# Patient Record
(demographics unavailable — no encounter records)

---

## 2025-03-10 NOTE — REASON FOR VISIT
"  \"I just don't know if subliminally I am trying to get fatter. I just haven't done anything.   \"I don't know where I am but I'm just not helping myself at all\".       " [Follow-Up Visit] : a follow-up visit for [Thrombocytosis] : thrombocytosis

## 2025-03-17 NOTE — HISTORY OF PRESENT ILLNESS
[de-identified] : Ms. Abraham is a 39 year old woman who presents for initial consultation of thrombocytosis.\par  Patient states that these problems started in late 2016: she had her son. She had preeclampsia during the birthing process and developed post birth hypertension and , liver damage which appeared as a nonalcoholic fatty liver syndrome.  \par  She was initially diagnosed with MPN in 2019 and treated by Dr. Viera ([facility]), She was placed on a baby aspirin 81mg.  \par  Moved from [place] to [place] and wanted to changed to a different hematologist that was closer.  \par  Most recent labs on 2022 revealed that her platelets of 877\par  Patient denies any stroke like symptoms, DVTs, or pulmonary emboli. \par  She currently unemployed but she was a  which required a lot of sitting. \par  \par  Age of Menarche: 14 year old\par  LMP: current: 2022 \par  OCP/HRT:none\par   [de-identified] : The patient presents for follow up. Pt here for follow up Overall feels well Has not been having any issues with h/a, vision changes, dizziness, sob, chest pain, leg pain  Had MRI of brain on 09/2024, results reviewed with pt

## 2025-03-17 NOTE — DISCUSSION/SUMMARY
[FreeTextEntry1] : Voicemail left for patient 6/28 @ 850AM reviewing blood work;\par  both ferritin and iron sati increased patient likely absorbing PO iron\par  ferritin 35 from 18 and iron sat 34% from 14%; patient told she can try decr PO iron to TIW as she is constipated.\par  Vitamin D level now 37.8 since decr PO vitamin D to daily dosing.\par  Patient told to contact office if she has any questions and we will repeat all blood work in the fall.

## 2025-03-17 NOTE — HISTORY OF PRESENT ILLNESS
[de-identified] : Ms. Abraham is a 39 year old woman who presents for initial consultation of thrombocytosis.\par  Patient states that these problems started in late 2016: she had her son. She had preeclampsia during the birthing process and developed post birth hypertension and , liver damage which appeared as a nonalcoholic fatty liver syndrome.  \par  She was initially diagnosed with MPN in 2019 and treated by Dr. Viera ([facility]), She was placed on a baby aspirin 81mg.  \par  Moved from [place] to [place] and wanted to changed to a different hematologist that was closer.  \par  Most recent labs on 2022 revealed that her platelets of 877\par  Patient denies any stroke like symptoms, DVTs, or pulmonary emboli. \par  She currently unemployed but she was a  which required a lot of sitting. \par  \par  Age of Menarche: 14 year old\par  LMP: current: 2022 \par  OCP/HRT:none\par   [de-identified] : The patient presents for follow up. Pt here for follow up Overall feels well Has not been having any issues with h/a, vision changes, dizziness, sob, chest pain, leg pain  Had MRI of brain on 09/2024, results reviewed with pt

## 2025-03-17 NOTE — BEGINNING OF VISIT
[0] : 2) Feeling down, depressed, or hopeless: Not at all (0) [PHQ-2 Negative] : PHQ-2 Negative [Pain Scale: ___] : On a scale of 1-10, today the patient's pain is a(n) [unfilled]. [Never] : Never [Date Discussed (MM/DD/YY): ___] : Discussed: [unfilled] [Patient/Caregiver not ready to engage] : Patient/Caregiver not ready to engage [[id number]] : 0

## 2025-03-17 NOTE — BEGINNING OF VISIT
[0] : 2) Feeling down, depressed, or hopeless: Not at all (0) [PHQ-2 Negative] : PHQ-2 Negative [Pain Scale: ___] : On a scale of 1-10, today the patient's pain is a(n) [unfilled]. [Never] : Never [Date Discussed (MM/DD/YY): ___] : Discussed: [unfilled] [Patient/Caregiver not ready to engage] : Patient/Caregiver not ready to engage [[id number]] : 0

## 2025-03-17 NOTE — HISTORY OF PRESENT ILLNESS
[de-identified] : Ms. Abraham is a 39 year old woman who presents for initial consultation of thrombocytosis.\par  Patient states that these problems started in late 2016: she had her son. She had preeclampsia during the birthing process and developed post birth hypertension and , liver damage which appeared as a nonalcoholic fatty liver syndrome.  \par  She was initially diagnosed with MPN in 2019 and treated by Dr. Viera ([facility]), She was placed on a baby aspirin 81mg.  \par  Moved from [place] to [place] and wanted to changed to a different hematologist that was closer.  \par  Most recent labs on 2022 revealed that her platelets of 877\par  Patient denies any stroke like symptoms, DVTs, or pulmonary emboli. \par  She currently unemployed but she was a  which required a lot of sitting. \par  \par  Age of Menarche: 14 year old\par  LMP: current: 2022 \par  OCP/HRT:none\par   [de-identified] : The patient presents for follow up. Pt here for follow up Overall feels well Has not been having any issues with h/a, vision changes, dizziness, sob, chest pain, leg pain  Had MRI of brain on 09/2024, results reviewed with pt

## 2025-03-17 NOTE — BEGINNING OF VISIT
[0] : 2) Feeling down, depressed, or hopeless: Not at all (0) [PHQ-2 Negative] : PHQ-2 Negative [Pain Scale: ___] : On a scale of 1-10, today the patient's pain is a(n) [unfilled]. [Never] : Never [Date Discussed (MM/DD/YY): ___] : Discussed: [unfilled] [Patient/Caregiver not ready to engage] : Patient/Caregiver not ready to engage [[id number]] : 0

## 2025-03-17 NOTE — HISTORY OF PRESENT ILLNESS
[de-identified] : Ms. Abraham is a 39 year old woman who presents for initial consultation of thrombocytosis.\par  Patient states that these problems started in late 2016: she had her son. She had preeclampsia during the birthing process and developed post birth hypertension and , liver damage which appeared as a nonalcoholic fatty liver syndrome.  \par  She was initially diagnosed with MPN in 2019 and treated by Dr. Viera ([facility]), She was placed on a baby aspirin 81mg.  \par  Moved from [place] to [place] and wanted to changed to a different hematologist that was closer.  \par  Most recent labs on 2022 revealed that her platelets of 877\par  Patient denies any stroke like symptoms, DVTs, or pulmonary emboli. \par  She currently unemployed but she was a  which required a lot of sitting. \par  \par  Age of Menarche: 14 year old\par  LMP: current: 2022 \par  OCP/HRT:none\par   [de-identified] : The patient presents for follow up. Pt here for follow up Overall feels well Has not been having any issues with h/a, vision changes, dizziness, sob, chest pain, leg pain  Had MRI of brain on 09/2024, results reviewed with pt

## 2025-03-17 NOTE — BEGINNING OF VISIT
[0] : 2) Feeling down, depressed, or hopeless: Not at all (0) [PHQ-2 Negative] : PHQ-2 Negative [Pain Scale: ___] : On a scale of 1-10, today the patient's pain is a(n) [unfilled]. [Never] : Never [Date Discussed (MM/DD/YY): ___] : Discussed: [unfilled] [Patient/Caregiver not ready to engage] : Patient/Caregiver not ready to engage [[id number]] : 0

## 2025-03-17 NOTE — ASSESSMENT
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 Viera - reviewed records She does not wish to become pregnant again but should she, would need to see high risk OB and likely would need AC in form of Lovenox 710  continue ASA 81 mg. Does not need hydrea currently. she will call with HA, Vision changes, dizziness, cp, sob, leg pain  #iron deficiency iron wnl and ferritin pending on oral iron  #transminitis fatty liver saw GI in [place]  #anxiety on wellbutrin  #vit D deficiency pending Vit D  #Health maintenance on wegovy mammmo - scheduled for te fall GYN - pap normal. [facility] - due at 45  RTC in 6 months with CBC with diff, cmp, iron, ferritin, b12, folate, tsh, vit D